# Patient Record
Sex: FEMALE | Race: OTHER | Employment: STUDENT | URBAN - METROPOLITAN AREA
[De-identification: names, ages, dates, MRNs, and addresses within clinical notes are randomized per-mention and may not be internally consistent; named-entity substitution may affect disease eponyms.]

---
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
## 2022-01-27 DIAGNOSIS — N92.0 MENORRHAGIA WITH REGULAR CYCLE: ICD-10-CM

## 2022-01-27 DIAGNOSIS — L70.0 ACNE VULGARIS: ICD-10-CM

## 2022-01-27 RX ORDER — NORGESTIMATE AND ETHINYL ESTRADIOL
KIT
Qty: 84 TABLET | Refills: 0 | Status: SHIPPED | OUTPATIENT
Start: 2022-01-27

## 2022-01-27 NOTE — TELEPHONE ENCOUNTER
Gynecology Medication Protocol Failed 01/27/2022 01:39 AM    PASS-PENDING LAST PAP WNL--VIA MANUAL LOOKUP    Physical or Pelvic/Breast in past 12 or next 3 mos--VIA MANUAL LOOKUP     Requested Prescriptions     Pending Prescriptions Disp Refills   • TRI-LO

## 2022-03-13 PROCEDURE — 87491 CHLMYD TRACH DNA AMP PROBE: CPT | Performed by: OBSTETRICS & GYNECOLOGY

## 2022-03-13 PROCEDURE — 87591 N.GONORRHOEAE DNA AMP PROB: CPT | Performed by: OBSTETRICS & GYNECOLOGY

## 2022-03-14 ENCOUNTER — OFFICE VISIT (OUTPATIENT)
Dept: OBGYN CLINIC | Facility: CLINIC | Age: 22
End: 2022-03-14
Payer: COMMERCIAL

## 2022-03-14 VITALS
DIASTOLIC BLOOD PRESSURE: 72 MMHG | WEIGHT: 130 LBS | SYSTOLIC BLOOD PRESSURE: 110 MMHG | BODY MASS INDEX: 23.04 KG/M2 | HEIGHT: 63 IN

## 2022-03-14 DIAGNOSIS — Z01.419 ENCOUNTER FOR GYNECOLOGICAL EXAMINATION WITHOUT ABNORMAL FINDING: Primary | ICD-10-CM

## 2022-03-14 DIAGNOSIS — L70.0 ACNE VULGARIS: ICD-10-CM

## 2022-03-14 DIAGNOSIS — Z30.41 ENCOUNTER FOR SURVEILLANCE OF CONTRACEPTIVE PILLS: ICD-10-CM

## 2022-03-14 DIAGNOSIS — N92.0 MENORRHAGIA WITH REGULAR CYCLE: ICD-10-CM

## 2022-03-14 PROCEDURE — 3008F BODY MASS INDEX DOCD: CPT | Performed by: OBSTETRICS & GYNECOLOGY

## 2022-03-14 PROCEDURE — 87205 SMEAR GRAM STAIN: CPT | Performed by: OBSTETRICS & GYNECOLOGY

## 2022-03-14 PROCEDURE — 99395 PREV VISIT EST AGE 18-39: CPT | Performed by: OBSTETRICS & GYNECOLOGY

## 2022-03-14 PROCEDURE — 3078F DIAST BP <80 MM HG: CPT | Performed by: OBSTETRICS & GYNECOLOGY

## 2022-03-14 PROCEDURE — 87106 FUNGI IDENTIFICATION YEAST: CPT | Performed by: OBSTETRICS & GYNECOLOGY

## 2022-03-14 PROCEDURE — 3074F SYST BP LT 130 MM HG: CPT | Performed by: OBSTETRICS & GYNECOLOGY

## 2022-03-14 PROCEDURE — 87808 TRICHOMONAS ASSAY W/OPTIC: CPT | Performed by: OBSTETRICS & GYNECOLOGY

## 2022-03-14 RX ORDER — TRETINOIN 0.025 %
CREAM (GRAM) TOPICAL
COMMUNITY
Start: 2022-01-15

## 2022-03-14 RX ORDER — NORGESTIMATE AND ETHINYL ESTRADIOL 7DAYSX3 LO
1 KIT ORAL DAILY
Qty: 84 TABLET | Refills: 3 | Status: SHIPPED | OUTPATIENT
Start: 2022-03-14

## 2022-03-15 LAB
C TRACH DNA SPEC QL NAA+PROBE: NEGATIVE
N GONORRHOEA DNA SPEC QL NAA+PROBE: NEGATIVE

## 2022-03-16 LAB
GENITAL VAGINOSIS SCREEN: NEGATIVE
TRICHOMONAS SCREEN: NEGATIVE

## 2023-01-10 ENCOUNTER — PATIENT MESSAGE (OUTPATIENT)
Facility: CLINIC | Age: 23
End: 2023-01-10

## 2023-01-10 ENCOUNTER — LAB ENCOUNTER (OUTPATIENT)
Dept: LAB | Facility: HOSPITAL | Age: 23
End: 2023-01-10
Attending: STUDENT IN AN ORGANIZED HEALTH CARE EDUCATION/TRAINING PROGRAM
Payer: COMMERCIAL

## 2023-01-10 ENCOUNTER — OFFICE VISIT (OUTPATIENT)
Facility: CLINIC | Age: 23
End: 2023-01-10
Payer: COMMERCIAL

## 2023-01-10 VITALS
DIASTOLIC BLOOD PRESSURE: 73 MMHG | WEIGHT: 129 LBS | SYSTOLIC BLOOD PRESSURE: 108 MMHG | BODY MASS INDEX: 22.86 KG/M2 | HEIGHT: 63 IN | HEART RATE: 108 BPM

## 2023-01-10 DIAGNOSIS — R10.9 ABDOMINAL DISCOMFORT: ICD-10-CM

## 2023-01-10 DIAGNOSIS — R19.4 ALTERED BOWEL HABITS: ICD-10-CM

## 2023-01-10 DIAGNOSIS — R10.9 ABDOMINAL DISCOMFORT: Primary | ICD-10-CM

## 2023-01-10 LAB
CRP SERPL-MCNC: 0.4 MG/DL (ref ?–0.3)
IGA SERPL-MCNC: 183 MG/DL (ref 70–312)

## 2023-01-10 PROCEDURE — 36415 COLL VENOUS BLD VENIPUNCTURE: CPT

## 2023-01-10 PROCEDURE — 86140 C-REACTIVE PROTEIN: CPT

## 2023-01-10 PROCEDURE — 99245 OFF/OP CONSLTJ NEW/EST HI 55: CPT | Performed by: STUDENT IN AN ORGANIZED HEALTH CARE EDUCATION/TRAINING PROGRAM

## 2023-01-10 PROCEDURE — 3008F BODY MASS INDEX DOCD: CPT | Performed by: STUDENT IN AN ORGANIZED HEALTH CARE EDUCATION/TRAINING PROGRAM

## 2023-01-10 PROCEDURE — 3074F SYST BP LT 130 MM HG: CPT | Performed by: STUDENT IN AN ORGANIZED HEALTH CARE EDUCATION/TRAINING PROGRAM

## 2023-01-10 PROCEDURE — 86364 TISS TRNSGLTMNASE EA IG CLAS: CPT

## 2023-01-10 PROCEDURE — 3078F DIAST BP <80 MM HG: CPT | Performed by: STUDENT IN AN ORGANIZED HEALTH CARE EDUCATION/TRAINING PROGRAM

## 2023-01-10 PROCEDURE — 82784 ASSAY IGA/IGD/IGG/IGM EACH: CPT

## 2023-01-11 ENCOUNTER — TELEPHONE (OUTPATIENT)
Dept: GASTROENTEROLOGY | Facility: CLINIC | Age: 23
End: 2023-01-11

## 2023-01-11 ENCOUNTER — HOSPITAL ENCOUNTER (OUTPATIENT)
Dept: CT IMAGING | Facility: HOSPITAL | Age: 23
Discharge: HOME | End: 2023-01-11
Attending: STUDENT IN AN ORGANIZED HEALTH CARE EDUCATION/TRAINING PROGRAM
Payer: COMMERCIAL

## 2023-01-11 ENCOUNTER — LAB ENCOUNTER (OUTPATIENT)
Dept: LAB | Facility: HOSPITAL | Age: 23
End: 2023-01-11
Attending: STUDENT IN AN ORGANIZED HEALTH CARE EDUCATION/TRAINING PROGRAM
Payer: COMMERCIAL

## 2023-01-11 DIAGNOSIS — R19.4 ALTERED BOWEL HABITS: ICD-10-CM

## 2023-01-11 DIAGNOSIS — R10.9 ABDOMINAL DISCOMFORT: ICD-10-CM

## 2023-01-11 LAB
CREAT BLD-MCNC: 0.8 MG/DL
GFR SERPLBLD BASED ON 1.73 SQ M-ARVRAT: 108 ML/MIN/1.73M2 (ref 60–?)

## 2023-01-11 PROCEDURE — 74177 CT ABD & PELVIS W/CONTRAST: CPT | Performed by: STUDENT IN AN ORGANIZED HEALTH CARE EDUCATION/TRAINING PROGRAM

## 2023-01-11 PROCEDURE — 82565 ASSAY OF CREATININE: CPT

## 2023-01-11 PROCEDURE — 83993 ASSAY FOR CALPROTECTIN FECAL: CPT

## 2023-01-11 PROCEDURE — 87338 HPYLORI STOOL AG IA: CPT

## 2023-01-11 NOTE — TELEPHONE ENCOUNTER
GI Staff,    I ordered a CT A/P with IV contrast for this patient. .. is it covered by insurance or does it need prior authorization? I'm hoping to get scan done in the next 2 days, thanks for your help. Otto Baird

## 2023-01-11 NOTE — TELEPHONE ENCOUNTER
Managed care- please disregard. Dr. Mancia Call,    I checked with patient's insurance and confirmed no PA is required for -[phone]. #[phone]    She is unable to get it scheduled within next 2 days. DO you want to place it as stat?

## 2023-01-12 LAB — TTG IGA SER-ACNC: 0.5 U/ML (ref ?–7)

## 2023-01-13 LAB
CALPROTECTIN STL-MCNT: 39.5 ΜG/G (ref ?–50)
H PYLORI AG STL QL IA: NEGATIVE

## 2023-01-16 NOTE — PROGRESS NOTES
I called the patient regarding CT scan. She presented with altered bowel habits, bloating and GERD. CT A/P is notable for a large amount of stool seen throughout the entire colon. No other findings seen to contribute to her symptoms. My thoughts are that she is constipated and the stool burden/constipation is causing abdominal bloating and overflow diarrhea. I also believe this is exacerbating her symptoms of heartburn. My plan would be to give her a bowel purge followed by daily trulance 3mg. She would like to do this when she is back home and not at school. She is okay waiting until then and has no currently complaints.

## 2023-01-16 NOTE — PROGRESS NOTES
Celiac serologies negative. CRP essentially normal. Negative CT A/P other than constipation. She will contact me when back from school as per result note on CT scan.

## 2023-03-09 ENCOUNTER — PATIENT MESSAGE (OUTPATIENT)
Facility: CLINIC | Age: 23
End: 2023-03-09

## 2023-03-09 DIAGNOSIS — K59.00 CONSTIPATION, UNSPECIFIED CONSTIPATION TYPE: Primary | ICD-10-CM

## 2023-03-09 DIAGNOSIS — K59.04 CHRONIC IDIOPATHIC CONSTIPATION: ICD-10-CM

## 2023-03-10 NOTE — TELEPHONE ENCOUNTER
From: Jose Juan Carbone  To: Ifrah Kelly MD  Sent: 3/9/2023 4:28 PM CST  Subject: Bowel purge    Hello,    I hope you have been well since we last spoke! My spring break is starting on Saturday. I remember us talking about me doing a bowel purge over break. If that is still the plan what steps do I need to take to do that?        Thanks,  Jose Juan Carbone

## 2023-03-10 NOTE — TELEPHONE ENCOUNTER
Bowel purge sent to clean out bowels. After purge, should start Linzess 145mcg. Message sent to patient.

## 2023-05-02 ENCOUNTER — PATIENT MESSAGE (OUTPATIENT)
Facility: CLINIC | Age: 23
End: 2023-05-02

## 2023-05-04 NOTE — TELEPHONE ENCOUNTER
From: Johnny Santana  To: Ness Sim MD  Sent: 5/2/2023 2:44 PM CDT  Subject: Can I start medication while constipated? Hello,    After I did the bowel purge I forgot to start taking the medication to keep me regular. Could I start taking it now even though I am already constipated? Thank you,  Johnny Santana.

## 2023-06-15 ENCOUNTER — OFFICE VISIT (OUTPATIENT)
Facility: CLINIC | Age: 23
End: 2023-06-15
Payer: COMMERCIAL

## 2023-06-15 VITALS
OXYGEN SATURATION: 99 % | HEIGHT: 63 IN | DIASTOLIC BLOOD PRESSURE: 72 MMHG | BODY MASS INDEX: 23.04 KG/M2 | HEART RATE: 106 BPM | WEIGHT: 130 LBS | SYSTOLIC BLOOD PRESSURE: 110 MMHG

## 2023-06-15 DIAGNOSIS — R22.0 SUBCUTANEOUS NODULE OF HEAD: Primary | ICD-10-CM

## 2023-06-15 PROCEDURE — 3008F BODY MASS INDEX DOCD: CPT | Performed by: FAMILY MEDICINE

## 2023-06-15 PROCEDURE — 3074F SYST BP LT 130 MM HG: CPT | Performed by: FAMILY MEDICINE

## 2023-06-15 PROCEDURE — 3078F DIAST BP <80 MM HG: CPT | Performed by: FAMILY MEDICINE

## 2023-06-15 PROCEDURE — 99213 OFFICE O/P EST LOW 20 MIN: CPT | Performed by: FAMILY MEDICINE

## 2024-03-28 ENCOUNTER — PATIENT MESSAGE (OUTPATIENT)
Facility: CLINIC | Age: 24
End: 2024-03-28

## 2024-03-28 DIAGNOSIS — R13.19 ESOPHAGEAL DYSPHAGIA: Primary | ICD-10-CM

## 2024-03-28 NOTE — TELEPHONE ENCOUNTER
From: Virginia Oseguera  To: Jose Erazo  Sent: 3/28/2024 2:33 PM CDT  Subject: Sudden constant acid reflux despite taking PPI    I saw you a while ago for issues I had been having with constipation and acid reflux. At the time my primary concern was the constipation as Esomeprazole allowed me to manage my reflux. I was put on Linzess for the constipation but I ended up not taking it as I found its effects (mainly diarrhea) to be worse than living with episodes of constipation and bloating especially since I could somewhat manage it through my diet (though I will admit I was pretty bad about actually avoiding my triggers).     However, last friday it’s as though my PPI’s suddenly failed. I am taking it twice a day (in the past I generally tried to stick to twice a day but I wasn’t too strict about it as didn’t have any ill effects from only taking one), being very careful to take it on an empty stomach and waiting to eat, but I have seen no improvements. I have been trying to avoid foods I know make it worse. Pepcid does seem to help a bit as, if I take it before bed, I wake up feeling better, but that only lasts until I drink water. In general, it has been getting progressively worse.     I am planning on scheduling an appointment with you, however, I can see that you aren’t available until June, and my own schedule might infere with those times as well, so it may have to be even later than that. In the meantime what can I do? I am concerned it is going to damage my throat, especially since, during the worst moments, it makes my voice hoarse.    Thank you,  Virginia Oseguera.

## 2024-04-04 ENCOUNTER — TELEPHONE (OUTPATIENT)
Facility: CLINIC | Age: 24
End: 2024-04-04

## 2024-04-04 NOTE — TELEPHONE ENCOUNTER
I called the patient and left a voicemail.     She has barium esophagram scheduled on 4/6. I will call her again on 4/8 once this results.

## 2024-04-06 ENCOUNTER — HOSPITAL ENCOUNTER (OUTPATIENT)
Dept: GENERAL RADIOLOGY | Facility: HOSPITAL | Age: 24
Discharge: HOME | End: 2024-04-06
Attending: STUDENT IN AN ORGANIZED HEALTH CARE EDUCATION/TRAINING PROGRAM
Payer: COMMERCIAL

## 2024-04-06 DIAGNOSIS — R13.19 ESOPHAGEAL DYSPHAGIA: ICD-10-CM

## 2024-04-06 PROCEDURE — 74246 X-RAY XM UPR GI TRC 2CNTRST: CPT | Performed by: STUDENT IN AN ORGANIZED HEALTH CARE EDUCATION/TRAINING PROGRAM

## 2024-04-08 ENCOUNTER — TELEPHONE (OUTPATIENT)
Facility: CLINIC | Age: 24
End: 2024-04-08

## 2024-04-08 DIAGNOSIS — R13.10 DYSPHAGIA, UNSPECIFIED TYPE: ICD-10-CM

## 2024-04-08 DIAGNOSIS — K21.9 GASTROESOPHAGEAL REFLUX DISEASE, UNSPECIFIED WHETHER ESOPHAGITIS PRESENT: Primary | ICD-10-CM

## 2024-04-08 NOTE — TELEPHONE ENCOUNTER
Pt returning MD's call.  Pt states that she can be reached after 5:30.  See 4/4/24 TE.  Please call

## 2024-04-08 NOTE — PROGRESS NOTES
I called the patient to discuss the results of her barium esophagram but she did not answer.  I left a voicemail explaining the findings.  The barium esophagram was negative and normal.  The barium tablet passed without issues.  There is no stricture seen.    She may have eosinophilic esophagitis but this would only be assessed on an EGD.    She is in school right now in [facility] and therefore scheduling is difficult.    I did let the patient know if she can call back to further discuss results.  She should continue taking PPI as currently prescribed.

## 2024-04-10 ENCOUNTER — PATIENT MESSAGE (OUTPATIENT)
Facility: CLINIC | Age: 24
End: 2024-04-10

## 2024-04-10 RX ORDER — ESOMEPRAZOLE MAGNESIUM 40 MG/1
40 CAPSULE, DELAYED RELEASE ORAL
Qty: 180 CAPSULE | Refills: 1 | Status: SHIPPED | OUTPATIENT
Start: 2024-04-10 | End: 2024-10-07

## 2024-04-10 NOTE — TELEPHONE ENCOUNTER
Scheduled for:  EGD 11035   Provider Name:  Dr. Erazo   Date:  5/29/2024  Location:    [facility]  Sedation:  mac  Time:  10:15 (patient is aware arrival time is 9:15)  Prep:  NPO after midnight   Meds/Allergies Reconciled?:  Physician reviewed   Diagnosis with codes:  GERD K21.9  Dysphagia R13.10  Was patient informed to call insurance with codes (Y/N):  Yes, I confirmed [employer]  insurance with the patient.   Referral sent?:  Referral was sent at the time of electronic surgical scheduling.  EM or [facility] notified?:  I sent an electronic request to Endo Scheduling and received a confirmation today.   Medication Orders:  This patient verbally confirmed that she is not taking:   Iron, blood thinners, BP meds, and is not diabetic   Not latex allergy, Not PCN allergy and does not have a pacemaker  Misc Orders:  I discussed the prep instructions with the patient which she verbally understood and is aware that I will mychart the instructions today.    Further instructions given by staff:

## 2024-04-10 NOTE — TELEPHONE ENCOUNTER
I spoke to the patient regarding unremarkable esophagram which is reassuring.    Her symptoms have improved she is no longer feeling dysphagia.  But she still does have symptoms of heartburn.  She is taking Nexium 20 mg twice per day but despite that continues to have symptoms.    We will schedule her for EGD to rule out eosinophilic esophagitis and assess the severity of her esophagitis from symptoms.    I have also sent her Nexium 40 mg twice per day.    GI Staff:    Please schedule EGD with MAC [Diagnosis: GERD, dysphagia]    Thank you.

## 2024-04-11 NOTE — TELEPHONE ENCOUNTER
From: Virginia Oseguera  To: Jose Erazo  Sent: 4/10/2024 12:27 PM CDT  Subject: When to call?    Hello,    We seem to be having some trouble finding a time that works for both of us to call.     I am available today from 2:00 to 2:50, and after 4.     On Thursday I am available before 9:00 and then from 2 to 3 and then after 6.     On Friday I am available before 12:50 and then from 2:00 to 2:50 and then after 3.     Saturday I am available at any time, though if you call before 12 I will need to know the night before so I know when to set my alarm.     Do any of these times work for you?    Thank you,  Nanci.

## 2024-05-29 ENCOUNTER — ANESTHESIA EVENT (OUTPATIENT)
Dept: ENDOSCOPY | Age: 24
End: 2024-05-29
Payer: COMMERCIAL

## 2024-05-29 ENCOUNTER — HOSPITAL ENCOUNTER (OUTPATIENT)
Age: 24
Setting detail: HOSPITAL OUTPATIENT SURGERY
Discharge: HOME | End: 2024-05-29
Attending: STUDENT IN AN ORGANIZED HEALTH CARE EDUCATION/TRAINING PROGRAM | Admitting: STUDENT IN AN ORGANIZED HEALTH CARE EDUCATION/TRAINING PROGRAM
Payer: COMMERCIAL

## 2024-05-29 ENCOUNTER — ANESTHESIA (OUTPATIENT)
Dept: ENDOSCOPY | Age: 24
End: 2024-05-29
Payer: COMMERCIAL

## 2024-05-29 VITALS
SYSTOLIC BLOOD PRESSURE: 103 MMHG | HEIGHT: 64 IN | WEIGHT: 120 LBS | BODY MASS INDEX: 20.49 KG/M2 | RESPIRATION RATE: 15 BRPM | HEART RATE: 70 BPM | DIASTOLIC BLOOD PRESSURE: 71 MMHG | OXYGEN SATURATION: 100 %

## 2024-05-29 DIAGNOSIS — K21.9 GASTROESOPHAGEAL REFLUX DISEASE, UNSPECIFIED WHETHER ESOPHAGITIS PRESENT: ICD-10-CM

## 2024-05-29 DIAGNOSIS — R13.10 DYSPHAGIA, UNSPECIFIED TYPE: ICD-10-CM

## 2024-05-29 LAB — B-HCG UR QL: NEGATIVE

## 2024-05-29 PROCEDURE — 0DB68ZX EXCISION OF STOMACH, VIA NATURAL OR ARTIFICIAL OPENING ENDOSCOPIC, DIAGNOSTIC: ICD-10-PCS | Performed by: STUDENT IN AN ORGANIZED HEALTH CARE EDUCATION/TRAINING PROGRAM

## 2024-05-29 PROCEDURE — 0DB18ZX EXCISION OF UPPER ESOPHAGUS, VIA NATURAL OR ARTIFICIAL OPENING ENDOSCOPIC, DIAGNOSTIC: ICD-10-PCS | Performed by: STUDENT IN AN ORGANIZED HEALTH CARE EDUCATION/TRAINING PROGRAM

## 2024-05-29 PROCEDURE — 0DB98ZX EXCISION OF DUODENUM, VIA NATURAL OR ARTIFICIAL OPENING ENDOSCOPIC, DIAGNOSTIC: ICD-10-PCS | Performed by: STUDENT IN AN ORGANIZED HEALTH CARE EDUCATION/TRAINING PROGRAM

## 2024-05-29 PROCEDURE — 43239 EGD BIOPSY SINGLE/MULTIPLE: CPT | Performed by: STUDENT IN AN ORGANIZED HEALTH CARE EDUCATION/TRAINING PROGRAM

## 2024-05-29 PROCEDURE — 0DB38ZX EXCISION OF LOWER ESOPHAGUS, VIA NATURAL OR ARTIFICIAL OPENING ENDOSCOPIC, DIAGNOSTIC: ICD-10-PCS | Performed by: STUDENT IN AN ORGANIZED HEALTH CARE EDUCATION/TRAINING PROGRAM

## 2024-05-29 PROCEDURE — 99070 SPECIAL SUPPLIES PHYS/QHP: CPT | Performed by: STUDENT IN AN ORGANIZED HEALTH CARE EDUCATION/TRAINING PROGRAM

## 2024-05-29 RX ORDER — NALOXONE HYDROCHLORIDE 0.4 MG/ML
80 INJECTION, SOLUTION INTRAMUSCULAR; INTRAVENOUS; SUBCUTANEOUS AS NEEDED
OUTPATIENT
Start: 2024-05-29 | End: 2024-05-29

## 2024-05-29 RX ORDER — SODIUM CHLORIDE, SODIUM LACTATE, POTASSIUM CHLORIDE, CALCIUM CHLORIDE 600; 310; 30; 20 MG/100ML; MG/100ML; MG/100ML; MG/100ML
INJECTION, SOLUTION INTRAVENOUS CONTINUOUS
OUTPATIENT
Start: 2024-05-29

## 2024-05-29 RX ORDER — LIDOCAINE HYDROCHLORIDE 10 MG/ML
INJECTION, SOLUTION EPIDURAL; INFILTRATION; INTRACAUDAL; PERINEURAL AS NEEDED
Status: DISCONTINUED | OUTPATIENT
Start: 2024-05-29 | End: 2024-05-29 | Stop reason: SURG

## 2024-05-29 RX ORDER — PROGESTERONE 100 MG/1
CAPSULE ORAL
COMMUNITY
Start: 2024-05-15

## 2024-05-29 RX ORDER — SODIUM CHLORIDE, SODIUM LACTATE, POTASSIUM CHLORIDE, CALCIUM CHLORIDE 600; 310; 30; 20 MG/100ML; MG/100ML; MG/100ML; MG/100ML
INJECTION, SOLUTION INTRAVENOUS CONTINUOUS
Status: DISCONTINUED | OUTPATIENT
Start: 2024-05-29 | End: 2024-05-29

## 2024-05-29 RX ADMIN — SODIUM CHLORIDE, SODIUM LACTATE, POTASSIUM CHLORIDE, CALCIUM CHLORIDE: 600; 310; 30; 20 INJECTION, SOLUTION INTRAVENOUS at 11:31:00

## 2024-05-29 RX ADMIN — SODIUM CHLORIDE, SODIUM LACTATE, POTASSIUM CHLORIDE, CALCIUM CHLORIDE: 600; 310; 30; 20 INJECTION, SOLUTION INTRAVENOUS at 11:17:00

## 2024-05-29 RX ADMIN — SODIUM CHLORIDE, SODIUM LACTATE, POTASSIUM CHLORIDE, CALCIUM CHLORIDE: 600; 310; 30; 20 INJECTION, SOLUTION INTRAVENOUS at 11:25:00

## 2024-05-29 RX ADMIN — LIDOCAINE HYDROCHLORIDE 25 MG: 10 INJECTION, SOLUTION EPIDURAL; INFILTRATION; INTRACAUDAL; PERINEURAL at 11:20:00

## 2024-05-29 NOTE — OPERATIVE REPORT
ESOPHAGOGASTRODUODENOSCOPY (EGD) REPORT    Virginia Oseguera     2000 Age 24 year old   PCP Jaymie Beltran DO Endoscopist Jose Erazo MD       Date of procedure: 24    Procedure: EGD w/ biopsies    Pre-operative diagnosis: globus sensation    Post-operative diagnosis: normal examination    Medications: MAC    Complications: none    Procedure:  Informed consent was obtained from the patient after the risks of the procedure were discussed, including but not limited to bleeding, perforation, aspiration, infection, or possibility of a missed lesion. After discussions of the risks/benefits and alternatives to this procedure, as well as the planned sedation, the patient was placed in the left lateral decubitus position and begun on continuous blood pressure pulse oximetry and EKG monitoring and this was maintained throughout the procedure. Once an adequate level of sedation was obtained a bite block was placed. Then the lubricated tip of the [id number] diagnostic video upper endoscope was inserted and advanced using direct visualization into the posterior pharynx and ultimately into the esophagus. The scope was then advanced through the stomach and to the second portion of the duodenum.    Complications: None    Findings:      1. Esophagus: The squamocolumnar junction was noted at 38 cm and appeared regular. The diaphragmatic pinch was noted noted at 38 cm from the incisors. The esophageal mucosa appeared healthy and normal. There was no endoscopic findings of esophagitis, stricture or Lopez's esophagus. Biopsies were taken from the distal and proximal esophagus with a cold forceps for histology    2. Stomach: The stomach distended normally. Normal rugal folds were seen. The pylorus was patent. Retroflexion revealed a normal fundus. The gastric mucosa appeared healthy and normal. Biopsies were taken with a cold forceps from the antrum, incisura and body for histology.     3. Duodenum: The  duodenal mucosa appeared normal in the bulb and 2nd portion of the duodenum. Biopsied.    Impression:  -Esophagus: Normal appearing esophagus, no stricture. Biopsied.  -Stomach: Normal appearing gastric mucosa. Biopsied.  -Duodenum: Normal examined duodenum. Biopsied.  -Nothing seen on this exam to explain symptoms.    Recommend:  1. Continue PPI therapy.  2. Await pathology.    >>>If tissue was sampled/removed and you have not received your pathology results either by phone or letter within 2 weeks, please call our office at [phone].    Specimens:  Duodenum, gastric, distal and proximal esophagus.    Blood loss: <1 ml

## 2024-05-29 NOTE — ANESTHESIA PREPROCEDURE EVALUATION
Anesthesia PreOp Note    HPI:     Virginia Oseguera is a 24 year old female who presents for preoperative consultation requested by: Jose Erazo MD    Date of Surgery: 5/29/2024    Procedure(s):  ESOPHAGOGASTRODUODENOSCOPY (EGD)  Indication: Gastroesophageal reflux disease, unspecified whether esophagitis present/ Dysphagia, unspecified type    Relevant Problems   No relevant active problems       NPO:  Last Liquid Consumption Date: 05/29/24  Last Liquid Consumption Time: 0800  Last Solid Consumption Date: 05/29/24  Last Solid Consumption Time: 0100  Last Liquid Consumption Date: 05/29/24          History Review:  Patient Active Problem List    Diagnosis Date Noted    Acne vulgaris 02/25/2020    Menorrhagia with regular cycle 02/25/2020       Past Medical History:    Acne    ADHD (attention deficit hyperactivity disorder)    Allergic rhinitis    Maybe. Test came back negative but feel effect    Anxiety    Constipation    Comes and goes    Esophageal reflux    Probably. Still trying to figure out with gut doctor.       Past Surgical History:   Procedure Laterality Date    Other  age 3 year    laser treatment for hemangioma on back       Medications Prior to Admission   Medication Sig Dispense Refill Last Dose    progesterone 100 MG Oral Cap TAKE 1 CAPSULE (100 MG TOTAL) BY MOUTH DAILY AT BEDTIME FOR 10 DAYS   hasn't started    Esomeprazole Magnesium (NEXIUM) 40 MG Oral Capsule Delayed Release Take 1 capsule (40 mg total) by mouth 2 (two) times daily before meals. 180 capsule 1 5/29/2024    DULoxetine HCl 20 MG Oral Cap DR Particles Take 2 capsules (40 mg total) by mouth daily.   5/28/2024    buPROPion HCl ER, XL, 150 MG Oral Tablet 24 Hr    5/28/2024    VYVANSE 20 MG Oral Cap    5/26/2024    clonazePAM 0.5 MG Oral Tab 0.5 tablets (0.25 mg total) at bedtime.   5/27/2024    tretinoin 0.025 % External Cream .05       Multiple Vitamin (MULTI-DAY) Oral Tab Take by mouth.   More than a month     Current  Facility-Administered Medications Ordered in Epic   Medication Dose Route Frequency Provider Last Rate Last Admin    lactated ringers infusion   Intravenous Continuous Jose Erazo MD         No current [facility]-ordered outpatient medications on file.       No Known Allergies    Family History   Problem Relation Age of Onset    Anxiety Mother     Depression Mother     Arthritis Mother     Lipids Mother         High cholesterol    Heart Disease Maternal Grandfather     Heart Disorder Maternal Grandfather         Heart failure, first MI was at 39, everyone in his family had it as does my uncle    Hypertension Maternal Grandfather     Psychiatric Paternal Grandmother         Anxiety     Social History     Socioeconomic History    Marital status: Single   Occupational History    Occupation: Student     Comment: [employer] considering [employer]   Tobacco Use    Smoking status: Never    Smokeless tobacco: Never   Vaping Use    Vaping status: Never Used   Substance and Sexual Activity    Alcohol use: Never    Drug use: Never    Sexual activity: Yes     Partners: Male     Birth control/protection: Pill   Other Topics Concern    Blood Transfusions No    Caffeine Concern No    Stress Concern Yes     Comment: I have generalized anxiety disorder.    Weight Concern No    Special Diet No    Exercise Yes     Comment: Not so much in last month but generally I stay active    Seat Belt Yes       Available pre-op labs reviewed.  Lab Results   Component Value Date    URINEPREG Negative 05/29/2024             Vital Signs:  Body mass index is 20.6 kg/m².   height is 1.626 m (5' 4\") and weight is 54.4 kg (120 lb). Her blood pressure is 99/73 and her pulse is 77. Her respiration is 16 and oxygen saturation is 100%.   Vitals:    05/24/24 1343 05/29/24 1026   BP:  99/73   Pulse:  77   Resp:  16   SpO2:  100%   Weight: 54.4 kg (120 lb) 54.4 kg (120 lb)   Height: 1.626 m (5' 4\") 1.626 m (5' 4\")        Anesthesia Evaluation      Patient summary reviewed and Nursing notes reviewed    Airway   Mallampati: II  TM distance: >3 FB  Neck ROM: full  Dental      Pulmonary - negative ROS and normal exam    breath sounds clear to auscultation  Cardiovascular - negative ROS and normal exam    Rhythm: regular  Rate: normal    Neuro/Psych    (+)  anxiety/panic attacks,        GI/Hepatic/Renal    (+) GERD    Endo/Other - negative ROS   Abdominal                  Anesthesia Plan:   ASA:  2  Plan:   MAC and general  Informed Consent Plan and Risks Discussed With:  Patient      I have informed Virginia Oseguera and/or legal guardian or family member of the nature of the anesthetic plan, benefits, risks including possible dental damage if relevant, major complications, and any alternative forms of anesthetic management.   All of the patient's questions were answered to the best of my ability. The patient desires the anesthetic management as planned.  KEYA FONTANEZ MD  5/29/2024 11:09 AM  Present on Admission:  **None**

## 2024-05-29 NOTE — ANESTHESIA POSTPROCEDURE EVALUATION
Patient: iVrginia Oseguera    Procedure Summary       Date: 05/29/24 Room / Location: [facility] ENDOSCOPY 01 / [facility] ENDO    Anesthesia Start: 1117 Anesthesia Stop: 1136    Procedure: ESOPHAGOGASTRODUODENOSCOPY (EGD) Diagnosis:       Gastroesophageal reflux disease, unspecified whether esophagitis present      Dysphagia, unspecified type      (normal)    Surgeons: Jose Erazo MD Anesthesiologist: Keya Powers MD    Anesthesia Type: MAC, general ASA Status: 2            Anesthesia Type: MAC, general    Vitals Value Taken Time   /65 05/29/24 1137   Temp  05/29/24 1137   Pulse 83 05/29/24 1136   Resp 16 05/29/24 1136   SpO2 100 % 05/29/24 1136   Vitals shown include unfiled device data.    [facility] AN Post Evaluation:   Patient Evaluated in PACU  Patient Participation: complete - patient participated  Level of Consciousness: awake and alert  Pain Management: adequate  Airway Patency:patent  Dental exam unchanged from preop  Yes    Nausea/Vomiting: none  Cardiovascular Status: acceptable  Respiratory Status: acceptable  Postoperative Hydration acceptable      KEYA POWERS MD  5/29/2024 11:37 AM

## 2024-05-29 NOTE — H&P
History & Physical Examination    Patient Name: Virginia Oseguera  MRN: [id number]  [facility]: [id number]  YOB: 2000    Diagnosis: Uncontrolled GERD, EGD today    Medications Prior to Admission   Medication Sig Dispense Refill Last Dose    Esomeprazole Magnesium (NEXIUM) 40 MG Oral Capsule Delayed Release Take 1 capsule (40 mg total) by mouth 2 (two) times daily before meals. 180 capsule 1     DULoxetine HCl 20 MG Oral Cap DR Particles Take 2 capsules (40 mg total) by mouth daily.       buPROPion HCl ER, XL, 150 MG Oral Tablet 24 Hr        VYVANSE 20 MG Oral Cap        clonazePAM 0.5 MG Oral Tab 0.5 tablets (0.25 mg total) at bedtime.       tretinoin 0.025 % External Cream .05       Multiple Vitamin (MULTI-DAY) Oral Tab Take by mouth.   More than a month     Current Facility-Administered Medications   Medication Dose Route Frequency    lactated ringers infusion   Intravenous Continuous       Allergies: No Known Allergies    Past Medical History:    Acne    ADHD (attention deficit hyperactivity disorder)    Allergic rhinitis    Maybe. Test came back negative but feel effect    Anxiety    Constipation    Comes and goes    Esophageal reflux    Probably. Still trying to figure out with gut doctor.     Past Surgical History:   Procedure Laterality Date    Other  age 3 year    laser treatment for hemangioma on back     Family History   Problem Relation Age of Onset    Anxiety Mother     Depression Mother     Arthritis Mother     Lipids Mother         High cholesterol    Heart Disease Maternal Grandfather     Heart Disorder Maternal Grandfather         Heart failure, first MI was at 41, everyone in his family had it as does my uncle    Hypertension Maternal Grandfather     Psychiatric Paternal Grandmother         Anxiety     Social History     Tobacco Use    Smoking status: Never    Smokeless tobacco: Never   Substance Use Topics    Alcohol use: Never       SYSTEM Check if Review is Normal Check if Physical Exam is  Normal If not normal, please explain:   HEENT [X ] [ X]    NECK  [X ] [ X]    HEART [X ] [ X]    LUNGS [X ] [ X]    ABDOMEN [X ] [ X]    EXTREMITIES [X ] [ X]    OTHER        I have discussed the risks and benefits and alternatives of the procedure with the patient/family.  They understand and agree to proceed with plan of care.   I have reviewed the History and Physical done within the last 30 days.  Any changes noted above.    Jose Erazo MD  [facility]

## 2024-05-29 NOTE — DISCHARGE INSTRUCTIONS
Home Care Instructions for Gastroscopy with Sedation    Diet:  - Resume your regular diet as tolerated unless otherwise instructed.  - Start with light meals to minimize bloating.  - Do not drink alcohol today.    Medication:  - If you have questions about resuming your normal medications, please contact your Primary Care Physician.    Activities:  - Take it easy today. Do not return to work today.  - Do not drive today.  - Do not operate any machinery today (including kitchen equipment).    Gastroscopy:  - You may have a sore throat for 2-3 days following the exam. This is normal. Gargling with warm salt water (1/2 tsp salt to 1 glass warm water) or using throat lozenges will help.  - If you experience any sharp pain in your neck, abdomen or chest, vomiting of blood, oral temperature over 100 degrees Fahrenheit, light-headedness or dizziness, or any other problems, contact your doctor.    **If unable to reach your doctor, please go to the [facility] Emergency Room**    - Your referring physician will receive a full report of your examination.  - If you do not hear from your doctor's office within two weeks of your biopsy, please call them for your results.    You may be able to see your laboratory results in [facility] between 4 and 7 business days.  In some cases, your physician may not have viewed the results before they are released to [facility].  If you have questions regarding your results contact the physician who ordered the test/exam by phone or via [facility] by choosing \"Ask a Medical Question.\"

## 2024-06-03 NOTE — PROGRESS NOTES
EGD unremarkable.    Biopsies of the duodenum, stomach, esophagus unremarkable except for mild reflux esophagitis.    On PPI therapy. Mychart sent.

## 2024-08-23 ENCOUNTER — LAB ENCOUNTER (OUTPATIENT)
Dept: LAB | Age: 24
End: 2024-08-23
Attending: FAMILY MEDICINE
Payer: COMMERCIAL

## 2024-08-23 ENCOUNTER — OFFICE VISIT (OUTPATIENT)
Facility: CLINIC | Age: 24
End: 2024-08-23
Payer: COMMERCIAL

## 2024-08-23 VITALS
DIASTOLIC BLOOD PRESSURE: 68 MMHG | HEART RATE: 116 BPM | HEIGHT: 64 IN | WEIGHT: 126 LBS | SYSTOLIC BLOOD PRESSURE: 108 MMHG | OXYGEN SATURATION: 98 % | BODY MASS INDEX: 21.51 KG/M2

## 2024-08-23 DIAGNOSIS — Z00.00 ENCOUNTER FOR ROUTINE ADULT HEALTH EXAMINATION WITHOUT ABNORMAL FINDINGS: ICD-10-CM

## 2024-08-23 DIAGNOSIS — E53.8 B12 DEFICIENCY: ICD-10-CM

## 2024-08-23 DIAGNOSIS — K21.00 GASTROESOPHAGEAL REFLUX DISEASE WITH ESOPHAGITIS WITHOUT HEMORRHAGE: ICD-10-CM

## 2024-08-23 DIAGNOSIS — Z00.00 ENCOUNTER FOR ROUTINE ADULT HEALTH EXAMINATION WITHOUT ABNORMAL FINDINGS: Primary | ICD-10-CM

## 2024-08-23 LAB
ALBUMIN SERPL-MCNC: 4.3 G/DL (ref 3.2–4.8)
ALBUMIN/GLOB SERPL: 1.7 {RATIO} (ref 1–2)
ALP LIVER SERPL-CCNC: 65 U/L
ALT SERPL-CCNC: 21 U/L
ANION GAP SERPL CALC-SCNC: 6 MMOL/L (ref 0–18)
AST SERPL-CCNC: 21 U/L (ref ?–34)
BASOPHILS # BLD AUTO: 0.06 X10(3) UL (ref 0–0.2)
BASOPHILS NFR BLD AUTO: 1.2 %
BILIRUB SERPL-MCNC: 0.4 MG/DL (ref 0.3–1.2)
BUN BLD-MCNC: 11 MG/DL (ref 9–23)
BUN/CREAT SERPL: 12.9 (ref 10–20)
CALCIUM BLD-MCNC: 9.5 MG/DL (ref 8.7–10.4)
CHLORIDE SERPL-SCNC: 108 MMOL/L (ref 98–112)
CO2 SERPL-SCNC: 26 MMOL/L (ref 21–32)
CREAT BLD-MCNC: 0.85 MG/DL
DEPRECATED RDW RBC AUTO: 39.2 FL (ref 35.1–46.3)
EGFRCR SERPLBLD CKD-EPI 2021: 99 ML/MIN/1.73M2 (ref 60–?)
EOSINOPHIL # BLD AUTO: 0.06 X10(3) UL (ref 0–0.7)
EOSINOPHIL NFR BLD AUTO: 1.2 %
ERYTHROCYTE [DISTWIDTH] IN BLOOD BY AUTOMATED COUNT: 11.6 % (ref 11–15)
EST. AVERAGE GLUCOSE BLD GHB EST-MCNC: 97 MG/DL (ref 68–126)
FASTING STATUS PATIENT QL REPORTED: NO
GLOBULIN PLAS-MCNC: 2.6 G/DL (ref 2–3.5)
GLUCOSE BLD-MCNC: 99 MG/DL (ref 70–99)
HBA1C MFR BLD: 5 % (ref ?–5.7)
HCT VFR BLD AUTO: 36.3 %
HGB BLD-MCNC: 12.5 G/DL
IMM GRANULOCYTES # BLD AUTO: 0.01 X10(3) UL (ref 0–1)
IMM GRANULOCYTES NFR BLD: 0.2 %
LYMPHOCYTES # BLD AUTO: 1.54 X10(3) UL (ref 1–4)
LYMPHOCYTES NFR BLD AUTO: 31.7 %
MCH RBC QN AUTO: 31.8 PG (ref 26–34)
MCHC RBC AUTO-ENTMCNC: 34.4 G/DL (ref 31–37)
MCV RBC AUTO: 92.4 FL
MONOCYTES # BLD AUTO: 0.43 X10(3) UL (ref 0.1–1)
MONOCYTES NFR BLD AUTO: 8.8 %
NEUTROPHILS # BLD AUTO: 2.76 X10 (3) UL (ref 1.5–7.7)
NEUTROPHILS # BLD AUTO: 2.76 X10(3) UL (ref 1.5–7.7)
NEUTROPHILS NFR BLD AUTO: 56.9 %
OSMOLALITY SERPL CALC.SUM OF ELEC: 289 MOSM/KG (ref 275–295)
PLATELET # BLD AUTO: 251 10(3)UL (ref 150–450)
POTASSIUM SERPL-SCNC: 4.2 MMOL/L (ref 3.5–5.1)
PROT SERPL-MCNC: 6.9 G/DL (ref 5.7–8.2)
RBC # BLD AUTO: 3.93 X10(6)UL
SODIUM SERPL-SCNC: 140 MMOL/L (ref 136–145)
VIT B12 SERPL-MCNC: 676 PG/ML (ref 211–911)
WBC # BLD AUTO: 4.9 X10(3) UL (ref 4–11)

## 2024-08-23 PROCEDURE — 3008F BODY MASS INDEX DOCD: CPT | Performed by: FAMILY MEDICINE

## 2024-08-23 PROCEDURE — 3074F SYST BP LT 130 MM HG: CPT | Performed by: FAMILY MEDICINE

## 2024-08-23 PROCEDURE — 82607 VITAMIN B-12: CPT | Performed by: FAMILY MEDICINE

## 2024-08-23 PROCEDURE — 3078F DIAST BP <80 MM HG: CPT | Performed by: FAMILY MEDICINE

## 2024-08-23 PROCEDURE — 85025 COMPLETE CBC W/AUTO DIFF WBC: CPT | Performed by: FAMILY MEDICINE

## 2024-08-23 PROCEDURE — 80053 COMPREHEN METABOLIC PANEL: CPT | Performed by: FAMILY MEDICINE

## 2024-08-23 PROCEDURE — 83036 HEMOGLOBIN GLYCOSYLATED A1C: CPT | Performed by: FAMILY MEDICINE

## 2024-08-23 PROCEDURE — 99395 PREV VISIT EST AGE 18-39: CPT | Performed by: FAMILY MEDICINE

## 2024-08-23 RX ORDER — LISDEXAMFETAMINE DIMESYLATE 30 MG/1
30 CAPSULE ORAL EVERY MORNING
COMMUNITY
Start: 2024-08-09

## 2024-08-23 RX ORDER — TRETINOIN 1 MG/G
1 CREAM TOPICAL NIGHTLY
COMMUNITY
Start: 2024-07-06

## 2024-08-23 NOTE — PROGRESS NOTES
HPI:   Virginia Oseguera is a 24 year old female who presents for a complete physical exam.   Chief Complaint   Patient presents with    Physical    Follow - Up     Recently diagnosed with PCOS     She did not have a menstrual cycle for almost three months, and was diagnosed with PCOS. Had blood work done and a pelvic ultrasound as well, and the ultrasound showed numerous ovarian cysts. Was told she could take birth control pills or progesterone as needed, and decided to try the progesterone pill for now.   She had an EGD that showed gastritis, but biopsies were otherwise negative. She has been taking Nexium 40 mg twice daily since she had a bad flare-up since May, which seems to be helping.     Last pap: 5/2024 and normal    Menses: Previously irregular cycles, but more regular recently.   Contraception:  None    History of STD's: None  Family hx of breast, ovarian, cervical or colon CA: None  Diet and exercise: Will have a microwave omelette with bread and butter for breakfast. Then eats again in the afternoon, and will have Middle Eastern food, which is usually fish with vegetables. Dinner is a lot of tuna salad. Will eat prunes. Drinks a lot of water. She does walk, but not otherwise active.   Immunizations:  Tdap: 2020, HPV: Completed, Covid: Completed 2 doses    REVIEW OF SYSTEMS:   GENERAL: feels well otherwise   SKIN: denies any unusual skin lesions  EYES: no vision problems  BREAST: no lumps or masses, no nipple discharge   LUNGS: denies shortness of breath  CARDIOVASCULAR: denies chest pain  GI: denies abdominal pain, intermittent constipation but no diarrhea, no hematochezia, acid reflux, no hematochezia or melena   : denies dysuria, vaginal discharge or itching  NEURO: denies headaches  PSYCHE: denies depression or anxiety          Current Outpatient Medications   Medication Sig Dispense Refill    VYVANSE 30 MG Oral Cap Take 1 capsule (30 mg total) by mouth every morning.      Tretinoin 0.1 % External  Cream Apply 1 Application topically nightly.      Esomeprazole Magnesium (NEXIUM) 40 MG Oral Capsule Delayed Release Take 1 capsule (40 mg total) by mouth 2 (two) times daily before meals. 180 capsule 1    DULoxetine HCl 20 MG Oral Cap DR Particles Take 2 capsules (40 mg total) by mouth daily.      buPROPion HCl ER, XL, 150 MG Oral Tablet 24 Hr       clonazePAM 0.5 MG Oral Tab 0.5 tablets (0.25 mg total) at bedtime.      progesterone 100 MG Oral Cap TAKE 1 CAPSULE (100 MG TOTAL) BY MOUTH DAILY AT BEDTIME FOR 10 DAYS (Patient not taking: Reported on 8/23/2024)       No Known Allergies   Past Medical History:    Acne    ADHD (attention deficit hyperactivity disorder)    Allergic rhinitis    Maybe. Test came back negative but feel effect    Anxiety    Constipation    Comes and goes    Esophageal reflux    Probably. Still trying to figure out with gut doctor.      Past Surgical History:   Procedure Laterality Date    Egd  05/29/2024    ; normal    Other  age 3 year    laser treatment for hemangioma on back      Family History   Problem Relation Age of Onset    Anxiety Mother     Depression Mother     Arthritis Mother     Lipids Mother         High cholesterol    Heart Disease Maternal Grandfather     Heart Disorder Maternal Grandfather         Heart failure, first MI was at 39, everyone in his family had it as does my uncle    Hypertension Maternal Grandfather     Psychiatric Paternal Grandmother         Anxiety      Social History:   Social History     Socioeconomic History    Marital status: Single   Occupational History    Occupation: Student     Comment: [employer] considering integrative biology   Tobacco Use    Smoking status: Never    Smokeless tobacco: Never   Vaping Use    Vaping status: Never Used   Substance and Sexual Activity    Alcohol use: Never    Drug use: Never    Sexual activity: Not Currently     Partners: Male   Other Topics Concern    Blood Transfusions No    Caffeine Concern No    Stress  Concern Yes     Comment: I have generalized anxiety disorder.    Weight Concern No    Special Diet No    Exercise Yes     Comment: Not so much in last month but generally I stay active    Seat Belt Yes   Social History Narrative    Live with parents     Social Determinants of Health      Received [facility], [facility]    Social Connections    Received from [facility], [facility]    Housing Stability     Occ: Student at [employer]-studying biology. : No. Children: None.         EXAM:     Wt Readings from Last 6 Encounters:   08/23/24 126 lb (57.2 kg)   05/29/24 120 lb (54.4 kg)   06/15/23 130 lb (59 kg)   01/10/23 129 lb (58.5 kg)   03/14/22 130 lb (59 kg)   05/08/21 123 lb (55.8 kg) (42%, Z= -0.20)*     * Growth percentiles are based on [facility] (Girls, 2-20 Years) data.     Body mass index is 21.63 kg/m².   /68   Pulse 116   Ht 5' 4\" (1.626 m)   Wt 126 lb (57.2 kg)   SpO2 98%   BMI 21.63 kg/m²     GENERAL: well developed, well nourished, in no apparent distress   SKIN: no rashes, no suspicious lesions  HEENT: atraumatic, normocephalic, throat clear; normal dentition  EYES: PERRLA, EOMI, conjunctiva are clear  NECK: supple, no adenopathy or thyroid masses   BREAST: no dominant or suspicious mass, no nipple discharge  LUNGS: clear to auscultation  CARDIO: RRR without murmur  GI: good bowel sounds, no masses, HSM or tenderness  : deferred, sees gynecology   EXTREMITIES: no edema    Cholesterol, Total (mg/dL)   Date Value   05/13/2021 163     HDL Cholesterol (mg/dL)   Date Value   05/13/2021 64 (H)     LDL Cholesterol (mg/dL)   Date Value   05/13/2021 89      ASSESSMENT AND PLAN:   Virginia Oseguera is a 24 year old female who presents for a complete physical exam.  Encounter Diagnoses   Name Primary?    Encounter for routine adult health examination without abnormal findings Yes    Gastroesophageal reflux  disease with esophagitis without hemorrhage     B12 deficiency      Orders Placed This Encounter   Procedures    Comp Metabolic Panel (14) [E]    Vitamin B12 [E]    CBC W Differential W Platelet [E]    Hemoglobin A1C (Glycohemoglobin) [E]     Will check labs due to GERD and long-term Nexium use, and try to limit use or find alternative options for treatment.     Discussed with patient the following:  -Monthly breast self-exam  -Breast cancer screening/mammograms and clinical breast exams  -Cervical cancer screening/pap smears  -Colon cancer screening/colonoscopy  -Adequate calcium and Vitamin D intake to prevent osteoporosis  -Healthy diet including adequate intake of vegetables and fruits, appropriate portion sizes, minimizing highly concentrated carbohydrate foods  -Exercising 30 minutes a day most days of the week   -Diabetes screening which she desires  -Cholesterol screening: checked and negative   -Recommendation for yearly influenza vaccine  -Need for HPV vaccination series: completed   -Need for Tdap once as an adult and Td booster every 10 years  -Contraception: None  -STI screening (GC/Chlamydia/HIV): Not indicated  -Hepatitis C screening for all adults between the ages of 18 and 79: Checked and negative     All questions were answered during the visit and the patient verbalizes understanding. She will return in one year for next WWE or sooner as needed.    Meds & Refills for this Visit:  Requested Prescriptions      No prescriptions requested or ordered in this encounter       Imaging & Consults:  None    Jaymie Beltran DO  8/23/2024  12:10 PM

## 2024-10-02 DIAGNOSIS — K21.9 GASTROESOPHAGEAL REFLUX DISEASE, UNSPECIFIED WHETHER ESOPHAGITIS PRESENT: ICD-10-CM

## 2024-10-02 RX ORDER — ESOMEPRAZOLE MAGNESIUM 40 MG/1
40 CAPSULE, DELAYED RELEASE ORAL
Qty: 180 CAPSULE | Refills: 1 | Status: SHIPPED | OUTPATIENT
Start: 2024-10-02

## 2024-10-02 NOTE — TELEPHONE ENCOUNTER
Requested Prescriptions     Pending Prescriptions Disp Refills    ESOMEPRAZOLE MAGNESIUM 40 MG Oral Capsule Delayed Release [Pharmacy Med Name: ESOMEPRAZOLE MAG DR 40 MG CAP] 180 capsule 1     Sig: TAKE 1 CAPSULE BY MOUTH 2 TIMES DAILY BEFORE MEALS.         LOV   4/4/2024       LR   4/10/2024       TN

## 2025-01-20 ENCOUNTER — TELEMEDICINE (OUTPATIENT)
Facility: CLINIC | Age: 25
End: 2025-01-20
Payer: COMMERCIAL

## 2025-01-20 DIAGNOSIS — E28.2 PCOS (POLYCYSTIC OVARIAN SYNDROME): Primary | ICD-10-CM

## 2025-01-20 NOTE — PROGRESS NOTES
CC:    Chief Complaint   Patient presents with    Polycystic Ovary Syndrome (PCOS)    Other       HPI: 24 year old female presenting for video visit to discuss PCOS.   Reports she always has a lot of postnasal drainage, but right now the color is more yellow and chunky.  With PCOS she is concerned about developing insulin resistance.  She reports after eating a lot of sugar she gets quite sick.  She also gets sores in her mouth if she has a fruity candy.   Also notices if she does not have enough protein with her meals, especially in the morning and at lunch, she also feels bad.   She does try to minimize added sugars because of this.  She has not been exercising recently as she was very busy her last semester, but she does walk regularly.    She notices if she does not eat enough she gets shaky easily.   She also can not go too long between eating meals.   Her menstrual cycles have been more regular recently.   She only takes progesterone if she does not have a menstrual cycle.     ROS:  General:  No fevers/chills, no fatigue, no weight changes  HEENT:  Postnasal drainage and yellow nasal discharge   Cardio:  No chest pain  Pulmonary:  No cough, no SOB  GI:  No N/V/D, no abdominal pain   Dermatologic:  No rashes    Past Medical History:    Acne    ADHD (attention deficit hyperactivity disorder)    Allergic rhinitis    Maybe. Test came back negative but feel effect    Anxiety    Constipation    Comes and goes    Esophageal reflux    Probably. Still trying to figure out with gut doctor.       Social History     Socioeconomic History    Marital status: Single     Spouse name: Not on file    Number of children: Not on file    Years of education: Not on file    Highest education level: Not on file   Occupational History    Occupation: Student     Comment: [employer] considering integrative biology   Tobacco Use    Smoking status: Never    Smokeless tobacco: Never   Vaping Use    Vaping status: Never Used   Substance and  Sexual Activity    Alcohol use: Never    Drug use: Never    Sexual activity: Not Currently     Partners: Male   Other Topics Concern     Service Not Asked    Blood Transfusions No    Caffeine Concern No    Occupational Exposure Not Asked    Hobby Hazards Not Asked    Sleep Concern Not Asked    Stress Concern Yes     Comment: I have generalized anxiety disorder.    Weight Concern No    Special Diet No    Back Care Not Asked    Exercise Yes     Comment: Not so much in last month but generally I stay active    Bike Helmet Not Asked    Seat Belt Yes    Self-Exams Not Asked   Social History Narrative    Live with parents     Social Drivers of Health     Financial Resource Strain: Not on file   Food Insecurity: Not on file   Transportation Needs: Not on file   Physical Activity: Not on file   Stress: Not on file   Social Connections: Unknown (6/1/2021)    Received from [facility], [facility]    Social Connections     Conversations with friends/family/neighbors per week: Not on file   Housing Stability: Low Risk  (7/17/2021)    Received from [facility], [facility]    Housing Stability     Mortgage Payment Concerns?: Not on file     Number of Places Lived in the Last Year: Not on file     Unstable Housing?: Not on file       Current Outpatient Medications   Medication Sig Dispense Refill    ESOMEPRAZOLE MAGNESIUM 40 MG Oral Capsule Delayed Release TAKE 1 CAPSULE BY MOUTH 2 TIMES DAILY BEFORE MEALS. 180 capsule 1    VYVANSE 30 MG Oral Cap Take 1 capsule (30 mg total) by mouth every morning.      Tretinoin 0.1 % External Cream Apply 1 Application topically nightly.      buPROPion HCl ER, XL, 150 MG Oral Tablet 24 Hr       clonazePAM 0.5 MG Oral Tab 0.5 tablets (0.25 mg total) at bedtime.      progesterone 100 MG Oral Cap TAKE 1 CAPSULE (100 MG TOTAL) BY MOUTH DAILY AT BEDTIME FOR 10 DAYS (Patient not taking: Reported on 1/20/2025)          Patient has no known allergies.      Physical:  General:  Alert, appropriate, no acute distress, A&O x 3  Pulmonary:  Speaking in clear and full sentences, no dyspnea   Psych: normal mood and affect     Assessment and Plan: 24 year old female presenting for video visit to discuss PCOS.    1. PCOS (polycystic ovarian syndrome)    - Concerned about insulin resistance as she does react strongly to sugar and feels the effects of hypoglycemia with prolonged fasting so will check fasting insulin and CMP  - Advise eating at more regular intervals and watching intake of carbohydrates and sugars in addition to exercising regularly   - Comp Metabolic Panel (14) [E]; Future  - Insulin [E]; Future      Please note that the following visit was completed using two-way, real-time interactive audio and video communication. This has been done in good bhumi to provide continuity of care in the best interest of the provider-patient relationship, due to the ongoing public health crisis/national emergency and because of restrictions of visitation. There are limitations of this visit as no physical exam could be performed. Every conscious effort was taken to allow for sufficient and adequate time. This billing was spent on reviewing labs, medications, radiology tests and decision making. Appropriate medical decision-making and tests are ordered as detailed in the plan of care above.      Jaymie Beltran DO  01/20/25  9:58 AM

## 2025-04-05 DIAGNOSIS — K21.9 GASTROESOPHAGEAL REFLUX DISEASE, UNSPECIFIED WHETHER ESOPHAGITIS PRESENT: ICD-10-CM

## 2025-04-07 RX ORDER — ESOMEPRAZOLE MAGNESIUM 40 MG/1
40 CAPSULE, DELAYED RELEASE ORAL
Qty: 180 CAPSULE | Refills: 1 | Status: SHIPPED | OUTPATIENT
Start: 2025-04-07

## 2025-04-07 NOTE — TELEPHONE ENCOUNTER
Requested Prescriptions     Pending Prescriptions Disp Refills    ESOMEPRAZOLE MAGNESIUM 40 MG Oral Capsule Delayed Release [Pharmacy Med Name: ESOMEPRAZOLE MAG DR 40 MG CAP] 180 capsule 1     Sig: TAKE 1 CAPSULE BY MOUTH 2 TIMES DAILY BEFORE MEALS.     Last seen: 1/10/2023  Suggested follow up:  Last procedure 5/29/25 EGD  Last refill: 10/2/2024    Refill pended, please review/sign if agreeable.

## (undated) DEVICE — MEDI-VAC NON-CONDUCTIVE SUCTION TUBING 6MM X 1.8M (6FT.) L: Brand: CARDINAL HEALTH

## (undated) DEVICE — KIT CLEAN ENDOKIT 1.1OZ GOWNX2

## (undated) DEVICE — 60 ML SYRINGE REGULAR TIP: Brand: MONOJECT

## (undated) DEVICE — Device: Brand: DUAL NARE NASAL CANNULAE FEMALE LUER CON 7FT O2 TUBE

## (undated) DEVICE — GIJAW SINGLE-USE BIOPSY FORCEPS WITH NEEDLE: Brand: GIJAW

## (undated) DEVICE — CONMED SCOPE SAVER BITE BLOCK, 20X27 MM: Brand: SCOPE SAVER

## (undated) DEVICE — YANKAUER,BULB TIP,W/O VENT,RIGID,STERILE: Brand: MEDLINE

## (undated) DEVICE — KIT ENDO ORCAPOD 160/180/190

## (undated) NOTE — LETTER
[facility]  Administration of Anesthesia  I Virginia Oseguera agree to be cared for by a physician anesthesiologist alone and/or with a nurse anesthetist, who is specially trained to monitor me and give me medicine to put me to sleep or keep me comfortable during my procedure    I understand that my anesthesiologist and/or anesthetist is not an employee or agent of [facility] or [employer]. He or she works for [facility]    As the patient asking for anesthesia services, I agree to:  Allow the anesthesiologist (anesthesia doctor) to give me medicine and do additional procedures as necessary. Some examples are: Starting or using an “IV” to give me medicine, fluids or blood during my procedure, and having a breathing tube placed to help me breathe when I’m asleep (intubation). In the event that my heart stops working properly, I understand that my anesthesiologist will make every effort to sustain my life, unless otherwise directed by [facility] Do Not Resuscitate documents.  Tell my anesthesia doctor before my procedure:  If I am pregnant.  The last time that I ate or drank.  iii. All of the medicines I take (including prescriptions, herbal supplements, and pills I can buy without a prescription (including street drugs/illegal medications). Failure to inform my anesthesiologist about these medicines may increase my risk of anesthetic complications.  iv.If I am allergic to anything or have had a reaction to anesthesia before.  I understand how the anesthesia medicine will help me (benefits).  I understand that with any type of anesthesia medicine there are risks:  The most common risks are: nausea, vomiting, sore throat, muscle soreness, damage to my eyes, mouth, or teeth (from breathing tube placement).  Rare risks include: remembering what happened during my procedure, allergic reactions to medications, injury to my airway, heart, lungs, vision, nerves, or  muscles and in extremely rare instances death.  My doctor has explained to me other choices available to me for my care (alternatives).  Pregnant Patients (“epidural”):  I understand that the risks of having an epidural (medicine given into my back to help control pain during labor), include itching, low blood pressure, difficulty urinating, headache or slowing of the baby’s heart. Very rare risks include infection, bleeding, seizure, irregular heart rhythms and nerve injury.  Regional Anesthesia (“spinal”, “epidural”, & “nerve blocks”):  I understand that rare but potential complications include headache, bleeding, infection, seizure, irregular heart rhythms, and nerve injury.    _____________________________________________________________________________  Patient (or Representative) Signature/Relationship to Patient  Date   Time    _____________________________________________________________________________   Name (if used)    Language/Organization   Time    _____________________________________________________________________________  Nurse Anesthetist Signature     Date   Time  _____________________________________________________________________________  Anesthesiologist Signature     Date   Time  I have discussed the procedure and information above with the patient (or patient’s representative) and answered their questions. The patient or their representative has agreed to have anesthesia services.    _____________________________________________________________________________  Witness        Date   Time  I have verified that the signature is that of the patient or patient’s representative, and that it was signed before the procedure  Patient Name: Virginia Oseguera     : 2000                 Printed: 2024 at 6:44 AM    Medical Record #: [id number]                                            Page 1 of 1  ----------ANESTHESIA CONSENT----------

## (undated) NOTE — LETTER
[facility]  [address], [place], [place]    Authorization for Surgical Operation and Procedure                               I hereby authorize Jose Erazo MD, my physician and his/her assistants (if applicable), which may include medical students, residents, and/or fellows, to perform the following surgical operation/ procedure and administer such anesthesia as may be determined necessary by my physician: Operation/Procedure name (s) ESOPHAGOGASTRODUODENOSCOPY (EGD) on Virginia Oseguera   2.   I recognize that during the surgical operation/procedure, unforeseen conditions may necessitate additional or different procedures than those listed above.  I, therefore, further authorize and request that the above-named surgeon, assistants, or designees perform such procedures as are, in their judgment, necessary and desirable.    3.   My surgeon/physician has discussed prior to my surgery the potential benefits, risks and side effects of this procedure; the likelihood of achieving goals; and potential problems that might occur during recuperation.  They also discussed reasonable alternatives to the procedure, including risks, benefits, and side effects related to the alternatives and risks related to not receiving this procedure.  I have had all my questions answered and I acknowledge that no guarantee has been made as to the result that may be obtained.    4.   Should the need arise during my operation/procedure, which includes change of level of care prior to discharge, I also consent to the administration of blood and/or blood products.  Further, I understand that despite careful testing and screening of blood or blood products by collecting agencies, I may still be subject to ill effects as a result of receiving a blood transfusion and/or blood products.  The following are some, but not all, of the potential risks that can occur: fever and allergic reactions, hemolytic reactions,  transmission of diseases such as Hepatitis, AIDS and Cytomegalovirus (CMV) and fluid overload.  In the event that I wish to have an autologous transfusion of my own blood, or a directed donor transfusion, I will discuss this with my physician.  Check only if Refusing Blood or Blood Products  I understand refusal of blood or blood products as deemed necessary by my physician may have serious consequences to my condition to include possible death. I hereby assume responsibility for my refusal and release the hospital, its personnel, and my physicians from any responsibility for the consequences of my refusal.    o  Refuse   5.   I authorize the use of any specimen, organs, tissues, body parts or foreign objects that may be removed from my body during the operation/procedure for diagnosis, research or teaching purposes and their subsequent disposal by hospital authorities.  I also authorize the release of specimen test results and/or written reports to my treating physician on the hospital medical staff or other referring or consulting physicians involved in my care, at the discretion of the Pathologist or my treating physician.    6.   I consent to the photographing or videotaping of the operations or procedures to be performed, including appropriate portions of my body for medical, scientific, or educational purposes, provided my identity is not revealed by the pictures or by descriptive texts accompanying them.  If the procedure has been photographed/videotaped, the surgeon will obtain the original picture, image, videotape or CD.  The hospital will not be responsible for storage, release or maintenance of the picture, image, tape or CD.    7.   I consent to the presence of a  or observers in the operating room as deemed necessary by my physician or their designees.    8.   I recognize that in the event my procedure results in extended X-Ray/fluoroscopy time, I may develop a skin reaction.    9. If  I have a Do Not Attempt Resuscitation (DNAR) order in place, that status will be suspended while in the operating room, procedural suite, and during the recovery period unless otherwise explicitly stated by me (or a person authorized to consent on my behalf). The surgeon or my attending physician will determine when the applicable recovery period ends for purposes of reinstating the DNAR order.  10. Patients having a sterilization procedure: I understand that if the procedure is successful the results will be permanent and it will therefore be impossible for me to inseminate, conceive, or bear children.  I also understand that the procedure is intended to result in sterility, although the result has not been guaranteed.   11. I acknowledge that my physician has explained sedation/analgesia administration to me including the risk and benefits I consent to the administration of sedation/analgesia as may be necessary or desirable in the judgment of my physician.    I CERTIFY THAT I HAVE READ AND FULLY UNDERSTAND THE ABOVE CONSENT TO OPERATION and/or OTHER PROCEDURE.     ____________________________________  _________________________________        ______________________________  Signature of Patient    Signature of Responsible Person                Printed Name of Responsible Person                                      ____________________________________  _____________________________                ________________________________  Signature of Witness        Date  Time         Relationship to Patient    STATEMENT OF PHYSICIAN My signature below affirms that prior to the time of the procedure; I have explained to the patient and/or his/her legal representative, the risks and benefits involved in the proposed treatment and any reasonable alternative to the proposed treatment. I have also explained the risks and benefits involved in refusal of the proposed treatment and alternatives to the proposed treatment and have  answered the patient's questions. If I have a significant financial interest in a co-management agreement or a significant financial interest in any product or implant, or other significant relationship used in this procedure/surgery, I have disclosed this and had a discussion with my patient.     _____________________________________________________              _____________________________  (Signature of Physician)                                                                                         (Date)                                   (Time)  Patient Name: Virginia Oseguera      : 2000      Printed: 2024     Medical Record #: [id number]                                      Page 1 of 1